# Patient Record
Sex: MALE | Race: WHITE | NOT HISPANIC OR LATINO | URBAN - METROPOLITAN AREA
[De-identification: names, ages, dates, MRNs, and addresses within clinical notes are randomized per-mention and may not be internally consistent; named-entity substitution may affect disease eponyms.]

---

## 2017-11-07 ENCOUNTER — IMPORTED ENCOUNTER (OUTPATIENT)
Dept: URBAN - METROPOLITAN AREA CLINIC 9 | Facility: CLINIC | Age: 72
End: 2017-11-07

## 2018-11-05 ENCOUNTER — IMPORTED ENCOUNTER (OUTPATIENT)
Dept: URBAN - METROPOLITAN AREA CLINIC 9 | Facility: CLINIC | Age: 73
End: 2018-11-05

## 2019-11-12 ENCOUNTER — IMPORTED ENCOUNTER (OUTPATIENT)
Dept: URBAN - METROPOLITAN AREA CLINIC 9 | Facility: CLINIC | Age: 74
End: 2019-11-12

## 2020-08-25 NOTE — PATIENT DISCUSSION
DRY EYES : Discussed with patient the importance of keeping the eye moist and the symptoms associated with dry eyes including blurry vision, tearing, burning, and roddy sensation. Advised patient to minimize use of any fans blowing directly on the face. Advised patient to continue with artificial tears 2-3 times daily.

## 2020-08-25 NOTE — PATIENT DISCUSSION
Sofi - advised pt to use Warm compresses and if no changes to call the office for an appt.  Gave rx for Maxitrol drop to use 4 times a day for a week then 2 times a day for a week then stop

## 2020-09-08 NOTE — PATIENT DISCUSSION
EPIPHORA: I have discussed with the patient that the cause of tearing and pooling of tears may be due to tear duct being clogged or obstructed. Discussed options with patient of probing and irrigation versus following. The risks, benefits, alternatives include anesthesia, bleeding, infection, inflammation. Patient understands and wishes to proceed with probing and irrigation to improve tearing.  TEARS FLUSHED TO BACK OF THE THROAT

## 2020-11-11 ENCOUNTER — IMPORTED ENCOUNTER (OUTPATIENT)
Dept: URBAN - METROPOLITAN AREA CLINIC 9 | Facility: CLINIC | Age: 75
End: 2020-11-11

## 2020-11-11 PROBLEM — H25.13: Noted: 2020-11-11

## 2020-11-11 PROBLEM — E11.9: Noted: 2020-11-11

## 2021-10-15 ASSESSMENT — TONOMETRY
OS_IOP_MMHG: 16
OS_IOP_MMHG: 14
OS_IOP_MMHG: 12
OD_IOP_MMHG: 15
OD_IOP_MMHG: 16
OS_IOP_MMHG: 15
OD_IOP_MMHG: 14
OD_IOP_MMHG: 10

## 2021-10-15 ASSESSMENT — VISUAL ACUITY
OD_CC: 20/20 SN
OS_CC: 20/20 -2 SN

## 2021-10-26 ENCOUNTER — ESTABLISHED PATIENT (OUTPATIENT)
Dept: URBAN - METROPOLITAN AREA CLINIC 17 | Facility: CLINIC | Age: 76
End: 2021-10-26

## 2021-10-26 DIAGNOSIS — H25.13: ICD-10-CM

## 2021-10-26 DIAGNOSIS — E11.9: ICD-10-CM

## 2021-10-26 PROCEDURE — 92014 COMPRE OPH EXAM EST PT 1/>: CPT

## 2021-10-26 ASSESSMENT — VISUAL ACUITY
OD_CC: 20/25
OS_CC: 20/20

## 2021-10-26 ASSESSMENT — TONOMETRY
OS_IOP_MMHG: 15
OD_IOP_MMHG: 15

## 2022-05-10 NOTE — PATIENT DISCUSSION
Probe and irrigation performed in office today. Tears flushed to the back of the throat. also gave rx for maxitrol drops to use OU 4 times a day for a week then 2 times a day for a week then stop. Will see back in a month for full exam. Patient can also use otc patanol for itching as needed.

## 2022-05-10 NOTE — PROCEDURE NOTE: CLINICAL
PROCEDURE NOTE: Probing of Lacrimal Canaliculi, With or Without Irrigation OU. Diagnosis: Nasolacrimal Duct Obstruction. Prior to treatment, the risks/benefits/alternatives were discussed. The patient wished to proceed with procedure. Patient tolerated procedure well. There were no complications. Post-op instructions given. Carloz Mcnulty

## 2022-06-28 NOTE — PATIENT DISCUSSION
(Glaucoma Suspect - Stable) Intraocular pressures and optic nerves appear stable. Continue to follow closely as a glaucoma suspect.

## 2022-06-28 NOTE — PATIENT DISCUSSION
Moderate Dry Eyes: Prescribed disappearing preservative or preservative-free artificial tears 4-6x per day OU and the dailyReturn for follow-up as scheduled or sooner if symptoms persist.

## 2022-11-01 ENCOUNTER — ESTABLISHED PATIENT (OUTPATIENT)
Dept: URBAN - METROPOLITAN AREA CLINIC 17 | Facility: CLINIC | Age: 77
End: 2022-11-01

## 2022-11-01 DIAGNOSIS — H25.13: ICD-10-CM

## 2022-11-01 DIAGNOSIS — E11.9: ICD-10-CM

## 2022-11-01 PROCEDURE — 92014 COMPRE OPH EXAM EST PT 1/>: CPT

## 2022-11-01 ASSESSMENT — TONOMETRY
OS_IOP_MMHG: 10
OD_IOP_MMHG: 7

## 2022-11-01 ASSESSMENT — VISUAL ACUITY
OD_CC: 20/25
OS_CC: 20/20
OD_GLARE: 20/30
OS_GLARE: 20/25

## 2023-07-20 NOTE — PATIENT DISCUSSION
EPIPHORA: I have discussed with the patient that the cause of tearing and pooling of tears may be due to tear duct being clogged or obstructed. Discussed options with patient of probing and irrigation versus following. The risks, benefits, alternatives include anesthesia, bleeding, infection, inflammation. Patient understands and wishes to proceed with probing and irrigation to improve tearing. LORENZO

## 2023-11-14 ENCOUNTER — ESTABLISHED PATIENT (OUTPATIENT)
Dept: URBAN - METROPOLITAN AREA CLINIC 17 | Facility: CLINIC | Age: 78
End: 2023-11-14

## 2023-11-14 DIAGNOSIS — E11.9: ICD-10-CM

## 2023-11-14 DIAGNOSIS — H25.13: ICD-10-CM

## 2023-11-14 PROCEDURE — 92014 COMPRE OPH EXAM EST PT 1/>: CPT

## 2023-11-14 ASSESSMENT — TONOMETRY
OS_IOP_MMHG: 10
OD_IOP_MMHG: 11

## 2023-11-14 ASSESSMENT — VISUAL ACUITY
OD_CC: 20/20-2
OS_CC: 20/20-1

## 2025-01-27 ENCOUNTER — COMPREHENSIVE EXAM (OUTPATIENT)
Age: 80
End: 2025-01-27

## 2025-01-27 DIAGNOSIS — E11.9: ICD-10-CM

## 2025-01-27 DIAGNOSIS — H25.13: ICD-10-CM

## 2025-01-27 PROCEDURE — 92014 COMPRE OPH EXAM EST PT 1/>: CPT

## 2025-01-27 PROCEDURE — 92134 CPTRZ OPH DX IMG PST SGM RTA: CPT | Mod: NC
